# Patient Record
Sex: FEMALE | Race: WHITE
[De-identification: names, ages, dates, MRNs, and addresses within clinical notes are randomized per-mention and may not be internally consistent; named-entity substitution may affect disease eponyms.]

---

## 2020-09-08 ENCOUNTER — HOSPITAL ENCOUNTER (OUTPATIENT)
Dept: HOSPITAL 65 - RAD | Age: 60
Discharge: HOME | End: 2020-09-08
Attending: OBSTETRICS & GYNECOLOGY
Payer: COMMERCIAL

## 2020-09-08 DIAGNOSIS — R93.89: ICD-10-CM

## 2020-09-08 DIAGNOSIS — R10.31: ICD-10-CM

## 2020-09-08 DIAGNOSIS — D39.8: Primary | ICD-10-CM

## 2020-09-08 PROCEDURE — 76856 US EXAM PELVIC COMPLETE: CPT

## 2020-09-08 PROCEDURE — 76830 TRANSVAGINAL US NON-OB: CPT

## 2020-09-08 NOTE — DIREP
PROCEDURE:US PELVIS COMPLETE

 

COMPARISON:[facility], US, US PELVIS COMPLETE, 01/20/2020, 10:19 

AM.

 

INDICATIONS:RLQ PAIN R10.31

 

TECHNIQUE:Pelvic ultrasound using transabdominal technique.  Endovaginal 

images were also obtained for better assessment of the uterus and adnexa.

 

FINDINGS:

LMP: postmenopausal

UTERUS:Size is 7.2 x 2.5 x 4.1 cm.  The myometrium is homogeneous.  Multiple 

fibroids are visualized: (1) 1.1 x 0.9 x 1.8 cm, prior size 1.6 x 1.0 x 1.5 cm, 

(2) 0.9 x 0.6 x 1.0 cm, prior size 1.2 x 0.8 x 1.2 cm, and (3) 1.4 x 1.2 x 1.7 

cm, prior size 1.9 x 1.4 x 1.9 cm.  Nabothian cysts are noted.

ENDOMETRIUM:Thickness is 0.5 cm.  

 

RIGHT OVARY:Normal appearance.  1.4 x 0.9 x 1.1 cm.  

LEFT OVARY:Normal appearance.  1.4 x 0.8 x 0.9 cm.  

 

CUL-DE-SAC:Normal.

OTHER:Negative.

 

CONCLUSION:

1.  Leiomyomatous changes of the uterus similar to prior including an exophytic 

pedunculated fibroid in the right adnexa versus broad ligament fibroid.

2.  Borderline endometrial thickening for a postmenopausal female.

 

 

Dictated [facility]: [facility] on 09/08/2020 at 02:13 PM     

Electronically Signed By: Gorge Selby MD. on 09/08/2020 at 02:30 PM   

   

 

 

 

bs

## 2020-09-16 ENCOUNTER — HOSPITAL ENCOUNTER (OUTPATIENT)
Dept: HOSPITAL 65 - RAD | Age: 60
Discharge: HOME | End: 2020-09-16
Attending: NURSE PRACTITIONER
Payer: COMMERCIAL

## 2020-09-16 DIAGNOSIS — R10.11: Primary | ICD-10-CM

## 2020-09-16 PROCEDURE — 74019 RADEX ABDOMEN 2 VIEWS: CPT

## 2020-09-16 NOTE — DIREP
PROCEDURE:XR ABDOMEN 2 VIEWS

 

COMPARISON:[facility], CR, XRAY ABDOMEN 2VW, 11/19/2019, 02:31 

PM.  [facility], CR, XRAY ABDOMEN 2VW, 07/13/2018, 11:43 AM.  

[facility], CR, XRAY ABDOMEN 2VW, 11/14/2017, 01:37 PM.

 

INDICATIONS:R10.11 RUQ PAIN

 

TECHNIQUE:Flat and upright views of the abdomen are provided.

 

FINDINGS:

BOWEL GAS PATTERN:Normal.

CALCIFICATIONS:No suspicious calcifications.  Pelvic phleboliths noted.

LUNG BASES:Clear.

BONES:No aggressive osseous lesions.  Dextro tilt of the lumbar spine.  

Degenerative change of the femoral acetabular joints.

OTHER:Cholecystectomy clips.  A metallic clip overlies the left upper 

quadrant.

 

CONCLUSION:

1.  Normal bowel gas pattern.

 

 

 

 

 

Dictated by: Dann Lucas MD on 09/16/2020 at 04:11 PM     

Electronically Signed By: Dann Lucas MD on 09/16/2020 at 04:12 PM

## 2020-09-23 ENCOUNTER — HOSPITAL ENCOUNTER (OUTPATIENT)
Dept: HOSPITAL 65 - RAD | Age: 60
Discharge: HOME | End: 2020-09-23
Attending: NURSE PRACTITIONER
Payer: COMMERCIAL

## 2020-09-23 DIAGNOSIS — R16.0: ICD-10-CM

## 2020-09-23 DIAGNOSIS — K76.0: Primary | ICD-10-CM

## 2020-09-23 DIAGNOSIS — N28.1: ICD-10-CM

## 2020-09-23 PROCEDURE — 76700 US EXAM ABDOM COMPLETE: CPT

## 2020-09-23 NOTE — DIREP
PROCEDURE:ABDOMINAL ULTRASOUND

 

COMPARISON:[facility], CT, CT CHEST W/CONTRAST, 02/19/2020, 11:17 

AM.  [facility], US, US ABDOMEN COMPLETE, 10/24/2018, 09:37 AM.  

[facility], CT, CT ABD/PELVIS W/WO, 10/09/2018, 11:11 AM.  [facility], US, US ABDOMEN COMPLETE, 11/29/2017, 09:00 AM.  [facility], US, US ABDOMEN LIMITED(SINGLE ORGAN-QUAD), 07/20/2017, 08:04 

AM.  [facility], US, US ABDOMEN LIMITED(SINGLE ORGAN-QUAD), 

07/13/2016, 11:09 AM.

 

INDICATIONS:R10.11 RUQ PAIN

 

TECHNIQUE:High resolution sonographic examination was performed of the 

abdomen.

 

FINDINGS:

PANCREAS:Visualized portions of the pancreatic head, body and tail appear 

unremarkable. A portion of the pancreatic tail is partially obscured by bowel 

gas shadowing.  

LIVER:Enlarged.  Increased hepatic echotexture consistent with hepatic 

steatosis. No focal hepatic lesion is identified. Hepatopetal flow in the 

portal vein.  Normal spectral waveform on the portal vein.

GALLBLADDER:The gallbladder is surgically absent.  

BILIARY:There is no biliary ductal dilatation.  

RIGHT KIDNEY:No hydronephrosis. A 1.8 x 1.4 x 1.5 cm exophytic cyst is 

identified in the upper pole. A 2.0 x 1.3 x 1.8 cm exophytic cyst is identified 

adjacent in the upper pole, (previously 2.2 x 2.1 x 1.9 cm). A 1.8 x 1.3 x 1.1 

cm parapelvic cyst or mild intrarenal pelvis dilation is identified.  Tissue 

without increased through transmission was also measured in the interpolar 

region, favor related to parenchymal lobulation demonstrate previous 

cross-sectional imaging.

LEFT KIDNEY:No hydronephrosis. A 1.2 x 0.9 x 1.2 cm cortical cyst is 

identified in the lower pole.

SPLEEN:Normal.

AORTA:The visualized portions appear unremarkable.

IVC:Intrahepatic portions unremarkable.

OTHER:Negative.  No ascites is identified.  

 

AORTA (mid):1.6 x 1.6 cm

CBD:0.7 cm

LIVER:18.6 cm in length.

RIGHT KIDNEY:11.6 x 5.5 x 5.5 cm

LEFT KIDNEY:10.5 x 7.0 x 4.8 cm

SPLEEN:10.9 x 9.1 x 5.5 cm

  

CONCLUSION:

1. Hepatomegaly. Hepatic steatosis.

2. Bilateral renal cysts, benign appearing.

 

 

 

Dictated by: [facility] Physician on 09/23/2020 at 12:39 PM     

Electronically Signed By: Nida Perez MD on 09/23/2020 at 03:08 PM   

   

 

 

ac

## 2020-09-29 ENCOUNTER — HOSPITAL ENCOUNTER (OUTPATIENT)
Dept: HOSPITAL 65 - RAD | Age: 60
Discharge: HOME | End: 2020-09-29
Attending: NURSE PRACTITIONER
Payer: COMMERCIAL

## 2020-09-29 ENCOUNTER — HOSPITAL ENCOUNTER (OUTPATIENT)
Dept: HOSPITAL 65 - RAD | Age: 60
Discharge: HOME | End: 2020-09-29
Attending: OBSTETRICS & GYNECOLOGY
Payer: COMMERCIAL

## 2020-09-29 DIAGNOSIS — R10.31: ICD-10-CM

## 2020-09-29 DIAGNOSIS — I70.0: ICD-10-CM

## 2020-09-29 DIAGNOSIS — M43.17: ICD-10-CM

## 2020-09-29 DIAGNOSIS — N28.1: Primary | ICD-10-CM

## 2020-09-29 DIAGNOSIS — Z12.31: Primary | ICD-10-CM

## 2020-09-29 PROCEDURE — 74160 CT ABDOMEN W/CONTRAST: CPT

## 2020-09-29 PROCEDURE — 77067 SCR MAMMO BI INCL CAD: CPT

## 2020-09-29 NOTE — DIREP
PROCEDURE:Digital Screening Mammogram

 

TECHNIQUE:MLO, CC, and XCCL digital images of each breast are provided.  

Computer Assisted Detection (CAD) was utilized.   

 

COMPARISON:[facility], MAMMO BILATERAL SCREENING, 10/24/2018, 

11:15 AM.  [facility], MAMMO BILATERAL SCREENING, 11/07/2017, 

11:19 AM.  [facility], MAMMO BILATERAL SCREENING WITH CAD, 

10/29/2016, 09:47 AM.  [facility], MAMMO DIAGNOSTIC LT, 

01/30/2019, 11:06 AM.

 

INDICATIONS:SCREENING

 

BREAST COMPOSITION:There are scattered areas of fibroglandular density.

 

FINDINGS:There are no grouped microcalcifications, masses, or architectural 

distortions to suggest malignancy.  There is no significant change as compared 

with the previous examination(s).   

 

IMPRESSION:No mammographic evidence of malignancy.   

 

RECOMMENDATIONS:Routine Screening Mammography per [employer] of 

Radiology guidelines.    

 

 

OVERALL FINAL ASSESSMENT:BI-RADS 1 - Negative Mammogram

Note:  This facility participates in a mammography screening patient reminder 

system.

 

 

 

Dictated by: Rory Rinaldi M.D. on 09/29/2020 at 10:49 AM     

Electronically Signed By: Rory Rinaldi M.D. on 09/29/2020 at 10:51 AM

## 2020-09-29 NOTE — DIREP
PROCEDURE:CT ABDOMEN W/ CONTRAST

 

COMPARISON:[facility], CT, CT ABD/PELVIS W/WO, 10/09/2018, 11:11 

AM.

 

INDICATIONS:N28.1 CYST OF KIDNEY

 

TECHNIQUE:Axial images were created through the abdomen with non-ionic 

intravenous contrast material. 

No oral contrast was administered.

Sagittal and coronal reconstructions were performed from source images.

 

FINDINGS:

LUNG BASES:Normal.  No visible pulmonary or pleural disease.   

LIVER:Mild diffuse decreased attenuation is again seen throughout the liver.

BILIARY:Postcholecystectomy changes are seen.

PANCREAS:Normal.  No lesion, fluid collection, ductal dilatation, or atrophy.  

 

SPLEEN:Normal.  No enlargement or focal lesion. 

ADRENALS:Normal.  No mass or enlargement.  

URINARY TRACT:A 2.7 cm cyst is seen in the upper pole of the right kidney 

previously measuring 2 cm.  An 11 mm cortical cyst is noted posteriorly in the 

left kidney.  Mild cortical scarring is again seen of both kidneys.

AORTA/VASCULAR:Vascular calcification is seen of the abdominal aorta without 

aneurysmal dilatation.

RETROPERITONEUM:Normal.  No mass or adenopathy.  

BOWEL/MESENTERY:Normal.  There is no intestinal obstruction, free fluid, free 

air or mesenteric inflammatory changes.  

ABDOMINAL WALL:Normal.  No mass or hernia.  

PELVIC ORGANS:The pelvis was not imaged.

BONES:The L5-S1 level is partially visualized on the images and again 

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

OTHER:Negative.  

 

CONCLUSION:

1.  There is a 2.7 cm cyst in the upper pole of the right kidney and 11 mm 

cortical cyst of the left kidney.  Mild cortical scarring is again demonstrated 

of both kidneys.

2.  There are findings of mild diffuse steatosis again demonstrated of the 

liver.

 

 

 

Dictated by: on 09/29/2020 at 02:04 PM  Jeramy Britt M.D.  

Electronically Signed By: Jeramy Britt M.D. on 09/29/2020 at 02:04 PM

## 2021-07-15 ENCOUNTER — HOSPITAL ENCOUNTER (OUTPATIENT)
Dept: HOSPITAL 65 - RAD | Age: 61
Discharge: HOME | End: 2021-07-15
Attending: NURSE PRACTITIONER
Payer: COMMERCIAL

## 2021-07-15 DIAGNOSIS — K76.0: ICD-10-CM

## 2021-07-15 DIAGNOSIS — N28.1: Primary | ICD-10-CM

## 2021-07-15 DIAGNOSIS — Z90.49: ICD-10-CM

## 2021-07-15 PROCEDURE — 76705 ECHO EXAM OF ABDOMEN: CPT

## 2021-07-16 NOTE — DIREP
PROCEDURE:US ABDOMEN LIMITED (SINGLE ORGAN - QUAD)

 

COMPARISON:[facility], US, US ABDOMEN COMPLETE, 11/29/2017, 09:00 

AM.  [facility], CT, CT ABD/PELVIS W/WO, 08/29/2017, 10:27 AM.  

US, US ABDOMEN LIMITED(SINGLE ORGAN-QUAD), 08/17/2015, 09:16 AM.  [facility], US, US ABDOMEN COMPLETE, 09/23/2020, 10:28 AM.  CT, CT 

ABD/PELVIS W/ CONTRAST, 01/26/2018, 12:33 PM.  [facility], CT, CT 

ABDOMEN W/CONTRAST, 09/29/2020, 12:02 PM.  [facility], US, US 

ABDOMEN COMPLETE, 10/24/2018, 09:37 AM.  [facility], CT, CT 

ABD/PELVIS W/WO, 10/09/2018, 11:11 AM.  [facility], US, US ABDOMEN 

LIMITED(SINGLE ORGAN-QUAD), 07/20/2017, 08:04 AM.

 

INDICATIONS:K76.0 FATTY LIVER, CHOLECYSTECTOMY

 

TECHNIQUE:High resolution sonographic examination was performed of the 

abdomen.

 

FINDINGS:

 

PANCREAS:The pancreas is partially obscured by bowel gas shadowing. Visualized 

portions appear unremarkable.

LIVER:Mild diffuse increased echogenicity.  No focal hepatic lesion is 

identified. Hepatopetal flow in the portal vein.

GALLBLADDER:The gallbladder is surgically absent.  

BILIARY:There is no biliary ductal dilatation.  

RIGHT KIDNEY:No hydronephrosis. Two partially exophytic cysts are identified 

within the superior pole measuring 1.7 x 2.1 x 1.9 cm (previously 2.0 x 1.3 x 

1.8 cm) and 2.4 x 1.4 x 1.8 cm (previously 1.8 x 1.4 x 1.5 cm). Mild intrarenal 

pelvis dilation or a possible parapelvic cyst is visualized. 

SPLEEN:Normal.

OTHER:A 1.1 x 0.9 x 1.1 cm cortical cyst is incidentally noted within the 

superior pole of the left kidney. No ascites is identified.  

 

CBD:0.4 cm

RIGHT KIDNEY: 11.8 x 4.8 x 4.3 cm

SPLEEN: 10.1 x 9.0 x 5.3 cm

 

CONCLUSION:Cholecystectomy.  Probable fatty liver.  Bilateral renal cysts.  No 

other significant findings.

 

 

 

Dictated by: [facility] Physician on 07/16/2021 at 08:17 AM     

Electronically Signed By: Rory Rinaldi M.D. on 07/16/2021 at 09:02 AM   

   

 

 

ac

## 2021-08-23 ENCOUNTER — HOSPITAL ENCOUNTER (OUTPATIENT)
Dept: HOSPITAL 65 - RAD | Age: 61
Discharge: HOME | End: 2021-08-23
Attending: NURSE PRACTITIONER
Payer: COMMERCIAL

## 2021-08-23 DIAGNOSIS — R10.31: Primary | ICD-10-CM

## 2021-08-23 PROCEDURE — 74019 RADEX ABDOMEN 2 VIEWS: CPT

## 2021-08-23 NOTE — DIREP
PROCEDURE:XR ABDOMEN 2 VIEWS

 

COMPARISON:[facility], CR, XRAY ABDOMEN 2VW, 09/16/2020, 03:38 

PM.  [facility], CR, XRAY ABDOMEN 2VW, 11/19/2019, 02:31 PM.  

[facility], CR, XRAY ABDOMEN 2VW, 07/13/2018, 11:43 AM.

 

INDICATIONS:R10.31 RLQ PAIN

 

TECHNIQUE:Flat and upright views of the abdomen are provided.

 

FINDINGS:

BOWEL GAS PATTERN:Visualized bowel loops are nondilated.  Relative paucity 

bowel loops within the right upper quadrant and central abdomen which limits 

their evaluation.

CALCIFICATIONS:None significant.

LUNG BASES:Clear.

BONES:Degenerative changes lower thoracic lumbar spine.  Mild dextrocurvature 

of the upper lumbar spine.

OTHER:Cholecystectomy clips.

 

CONCLUSION:

Visualized bowel loops are nondilated.

Paucity of bowel gas in the central abdomen and right upper quadrant limits its 

evaluation.

 

 

 

 

Dictated by: Dann Lucas MD on 08/23/2021 at 03:15 PM     

Electronically Signed By: Dann Lucas MD on 08/23/2021 at 03:16 PM